# Patient Record
Sex: FEMALE | Race: WHITE | NOT HISPANIC OR LATINO | Employment: FULL TIME | URBAN - NONMETROPOLITAN AREA
[De-identification: names, ages, dates, MRNs, and addresses within clinical notes are randomized per-mention and may not be internally consistent; named-entity substitution may affect disease eponyms.]

---

## 2024-03-25 PROBLEM — E78.5 HYPERLIPIDEMIA: Status: ACTIVE | Noted: 2024-03-25

## 2024-03-25 PROBLEM — I10 ESSENTIAL HYPERTENSION: Status: ACTIVE | Noted: 2024-03-25

## 2024-03-25 PROBLEM — E78.1 ESSENTIAL HYPERTRIGLYCERIDEMIA: Status: ACTIVE | Noted: 2024-03-25

## 2024-03-25 PROBLEM — I25.10 CAD (CORONARY ARTERY DISEASE): Status: ACTIVE | Noted: 2024-03-25

## 2024-03-25 PROBLEM — E66.3 OVERWEIGHT: Status: ACTIVE | Noted: 2024-03-25

## 2024-03-25 PROBLEM — R07.89 CHEST DISCOMFORT: Status: ACTIVE | Noted: 2024-03-25

## 2024-03-25 PROBLEM — F17.200 CURRENT SMOKER: Status: ACTIVE | Noted: 2024-03-25

## 2024-03-25 PROBLEM — R94.30 ABNORMAL RESULTS OF CARDIOVASCULAR FUNCTION STUDIES: Status: ACTIVE | Noted: 2024-03-25

## 2024-03-25 PROBLEM — Z98.61 POST PTCA: Status: ACTIVE | Noted: 2024-03-25

## 2024-03-25 RX ORDER — ASPIRIN 81 MG/1
81 TABLET ORAL 3 TIMES WEEKLY
COMMUNITY

## 2024-03-25 RX ORDER — TIZANIDINE 4 MG/1
1 TABLET ORAL 3 TIMES DAILY PRN
COMMUNITY
End: 2024-04-09 | Stop reason: ALTCHOICE

## 2024-03-25 RX ORDER — FLUTICASONE PROPIONATE 50 MCG
2 SPRAY, SUSPENSION (ML) NASAL AS NEEDED
COMMUNITY

## 2024-03-25 RX ORDER — GABAPENTIN 100 MG/1
1 CAPSULE ORAL 3 TIMES DAILY
COMMUNITY
End: 2024-04-09 | Stop reason: ALTCHOICE

## 2024-03-25 RX ORDER — ATORVASTATIN CALCIUM 20 MG/1
20 TABLET, FILM COATED ORAL DAILY
COMMUNITY
End: 2024-04-09 | Stop reason: ALTCHOICE

## 2024-04-09 ENCOUNTER — OFFICE VISIT (OUTPATIENT)
Dept: CARDIOLOGY | Facility: CLINIC | Age: 59
End: 2024-04-09
Payer: COMMERCIAL

## 2024-04-09 ENCOUNTER — TELEPHONE (OUTPATIENT)
Dept: CARDIOLOGY | Facility: CLINIC | Age: 59
End: 2024-04-09

## 2024-04-09 VITALS
DIASTOLIC BLOOD PRESSURE: 70 MMHG | BODY MASS INDEX: 24.48 KG/M2 | SYSTOLIC BLOOD PRESSURE: 130 MMHG | HEART RATE: 74 BPM | HEIGHT: 67 IN | WEIGHT: 156 LBS

## 2024-04-09 DIAGNOSIS — E78.1 ESSENTIAL HYPERTRIGLYCERIDEMIA: ICD-10-CM

## 2024-04-09 DIAGNOSIS — E78.2 MIXED HYPERLIPIDEMIA: ICD-10-CM

## 2024-04-09 DIAGNOSIS — I25.10 CORONARY ARTERY DISEASE INVOLVING NATIVE CORONARY ARTERY OF NATIVE HEART WITHOUT ANGINA PECTORIS: Primary | ICD-10-CM

## 2024-04-09 DIAGNOSIS — Z98.61 POST PTCA: ICD-10-CM

## 2024-04-09 DIAGNOSIS — R07.9 CHEST PAIN, UNSPECIFIED TYPE: ICD-10-CM

## 2024-04-09 DIAGNOSIS — I10 ESSENTIAL HYPERTENSION: ICD-10-CM

## 2024-04-09 DIAGNOSIS — F17.200 CURRENT SMOKER: ICD-10-CM

## 2024-04-09 DIAGNOSIS — R07.89 CHEST DISCOMFORT: ICD-10-CM

## 2024-04-09 PROCEDURE — 99214 OFFICE O/P EST MOD 30 MIN: CPT | Performed by: INTERNAL MEDICINE

## 2024-04-09 PROCEDURE — 3078F DIAST BP <80 MM HG: CPT | Performed by: INTERNAL MEDICINE

## 2024-04-09 PROCEDURE — 3075F SYST BP GE 130 - 139MM HG: CPT | Performed by: INTERNAL MEDICINE

## 2024-04-09 ASSESSMENT — ENCOUNTER SYMPTOMS: PALPITATIONS: 1

## 2024-04-09 NOTE — LETTER
April 9, 2024     Stephanie Nick MD  [address] [place] [id number]    Patient: Carmina River   YOB: 1965   Date of Visit: 4/9/2024       Dear Dr. Stephanie Nick MD:    Thank you for referring Carmina River to me for evaluation. Below are my notes for this consultation.  If you have questions, please do not hesitate to call me. I look forward to following your patient along with you.       Sincerely,     Javed Monk MD      CC: No Recipients  ______________________________________________________________________________________    Subjective   Carmina River is a 58 y.o. female       Chief Complaint    Follow-up          HPI     Review of Systems   Cardiovascular:  Positive for chest pain and palpitations.   All other systems reviewed and are negative.     Patient returns in follow-up of problems as noted.  Recently she has been experiencing anginal type chest discomfort which she says is similar to symptoms she had about 20 years ago before her diagnosis of coronary disease and subsequent PTCA.  She is worried in this regard.  I recommended treadmill testing but she reminds me that she has severe degenerative joint disease of both knees and is unable to ambulate on a treadmill because of this a pharmacologic stress test with isotope imaging is recommended    Review of treatment strategy demonstrates that she, or someone, stopped her statin therapy.  Because of this we attempted to impress upon her the merits of reintroduction but she does not wish to do so and for the time being we will forego it but if in fact significant abnormalities are identified on stress test we will take the opportunity to impress upon her the paramount importance of statin therapy    She also continues to smoke.  She was counseled in this regard.  I pointed out to her that all the cardiology care in the world cannot overcome tobacco use and because of this she was encouraged to work on cessation.    For the  "moment it appears that her blood pressure is acceptable although mildly elevated and I believe could be improved with the addition of antihypertensive therapy.  Again she is resistant.  It appears her only concern is the presence or absence of coronary disease and she does not wish to modify her lifestyle or take medicine at the present time.      Vitals:    04/09/24 1115 04/09/24 1259   BP: 144/88 130/70   BP Location: Left arm Right arm   Patient Position: Sitting Sitting   Pulse: 74    Weight: 70.8 kg (156 lb)    Height: 1.702 m (5' 7\")         Objective   Physical Exam  Constitutional:       Appearance: Normal appearance.   HENT:      Nose: Nose normal.   Neck:      Vascular: No carotid bruit.   Cardiovascular:      Rate and Rhythm: Normal rate.      Pulses: Normal pulses.      Heart sounds: Normal heart sounds.   Pulmonary:      Effort: Pulmonary effort is normal.   Abdominal:      General: Bowel sounds are normal.      Palpations: Abdomen is soft.   Musculoskeletal:         General: Normal range of motion.      Cervical back: Normal range of motion.      Right lower leg: No edema.      Left lower leg: No edema.   Skin:     General: Skin is warm and dry.   Neurological:      General: No focal deficit present.      Mental Status: She is alert.   Psychiatric:         Mood and Affect: Mood normal.         Behavior: Behavior normal.         Thought Content: Thought content normal.         Judgment: Judgment normal.         Allergies  Ace inhibitors, Codeine, Penicillins, and Sulfa (sulfonamide antibiotics)     Current Medications    Current Outpatient Medications:   •  aspirin 81 mg EC tablet, Take 1 tablet (81 mg) by mouth 3 times a week., Disp: , Rfl:   •  fluticasone (Flonase) 50 mcg/actuation nasal spray, Administer 2 sprays into affected nostril(s) once daily., Disp: , Rfl:                      Assessment/Plan   1. Coronary artery disease involving native coronary artery of native heart without angina " pectoris  Patient has recurrent symptomatology and this may represent progression of coronary disease.  Hence, stress testing with isotope imaging is ordered    2. Essential hypertension  Blood pressure control could be improved but she does not wish to take medicine at this time    3. Essential hypertriglyceridemia  Could be improved but does not wish to take medicine    4. Mixed hyperlipidemia  Could be improved but does not wish to take medicine    5. Post PTCA  Previously a durable result of been achieved.  Now.  She may have had progression of disease.    6. Current smoker  The paramount importance of smoking cessation was emphasized    7. Chest pain, unspecified type  Symptoms are suspicious for angina    8. Chest discomfort  Symptoms are suspicious for angina      Scribe Attestation  By signing my name below, I, Zelda BERTRAND LPN  , Rosette   attest that this documentation has been prepared under the direction and in the presence of Javed Monk MD.     Provider Attestation - Scribe documentation    All medical record entries made by the Scribe were at my direction and personally dictated by me. I have reviewed the chart and agree that the record accurately reflects my personal performance of the history, physical exam, discussion and plan.

## 2024-04-09 NOTE — PROGRESS NOTES
"Subjective   Carmina River is a 58 y.o. female       Chief Complaint    Follow-up          HPI     Review of Systems   Cardiovascular:  Positive for chest pain and palpitations.   All other systems reviewed and are negative.     Patient returns in follow-up of problems as noted.  Recently she has been experiencing anginal type chest discomfort which she says is similar to symptoms she had about 20 years ago before her diagnosis of coronary disease and subsequent PTCA.  She is worried in this regard.  I recommended treadmill testing but she reminds me that she has severe degenerative joint disease of both knees and is unable to ambulate on a treadmill because of this a pharmacologic stress test with isotope imaging is recommended    Review of treatment strategy demonstrates that she, or someone, stopped her statin therapy.  Because of this we attempted to impress upon her the merits of reintroduction but she does not wish to do so and for the time being we will forego it but if in fact significant abnormalities are identified on stress test we will take the opportunity to impress upon her the paramount importance of statin therapy    She also continues to smoke.  She was counseled in this regard.  I pointed out to her that all the cardiology care in the world cannot overcome tobacco use and because of this she was encouraged to work on cessation.    For the moment it appears that her blood pressure is acceptable although mildly elevated and I believe could be improved with the addition of antihypertensive therapy.  Again she is resistant.  It appears her only concern is the presence or absence of coronary disease and she does not wish to modify her lifestyle or take medicine at the present time.      Vitals:    04/09/24 1115 04/09/24 1259   BP: 144/88 130/70   BP Location: Left arm Right arm   Patient Position: Sitting Sitting   Pulse: 74    Weight: 70.8 kg (156 lb)    Height: 1.702 m (5' 7\")         Objective "   Physical Exam  Constitutional:       Appearance: Normal appearance.   HENT:      Nose: Nose normal.   Neck:      Vascular: No carotid bruit.   Cardiovascular:      Rate and Rhythm: Normal rate.      Pulses: Normal pulses.      Heart sounds: Normal heart sounds.   Pulmonary:      Effort: Pulmonary effort is normal.   Abdominal:      General: Bowel sounds are normal.      Palpations: Abdomen is soft.   Musculoskeletal:         General: Normal range of motion.      Cervical back: Normal range of motion.      Right lower leg: No edema.      Left lower leg: No edema.   Skin:     General: Skin is warm and dry.   Neurological:      General: No focal deficit present.      Mental Status: She is alert.   Psychiatric:         Mood and Affect: Mood normal.         Behavior: Behavior normal.         Thought Content: Thought content normal.         Judgment: Judgment normal.         Allergies  Ace inhibitors, Codeine, Penicillins, and Sulfa (sulfonamide antibiotics)     Current Medications    Current Outpatient Medications:     aspirin 81 mg EC tablet, Take 1 tablet (81 mg) by mouth 3 times a week., Disp: , Rfl:     fluticasone (Flonase) 50 mcg/actuation nasal spray, Administer 2 sprays into affected nostril(s) once daily., Disp: , Rfl:                      Assessment/Plan   1. Coronary artery disease involving native coronary artery of native heart without angina pectoris  Patient has recurrent symptomatology and this may represent progression of coronary disease.  Hence, stress testing with isotope imaging is ordered    2. Essential hypertension  Blood pressure control could be improved but she does not wish to take medicine at this time    3. Essential hypertriglyceridemia  Could be improved but does not wish to take medicine    4. Mixed hyperlipidemia  Could be improved but does not wish to take medicine    5. Post PTCA  Previously a durable result of been achieved.  Now.  She may have had progression of disease.    6.  Current smoker  The paramount importance of smoking cessation was emphasized    7. Chest pain, unspecified type  Symptoms are suspicious for angina    8. Chest discomfort  Symptoms are suspicious for angina      Scribe Attestation  By signing my name below, I, Rosette Borjas LPN   attest that this documentation has been prepared under the direction and in the presence of Javed Monk MD.     Provider Attestation - Scribe documentation    All medical record entries made by the Scribe were at my direction and personally dictated by me. I have reviewed the chart and agree that the record accurately reflects my personal performance of the history, physical exam, discussion and plan.

## 2024-05-14 PROCEDURE — 93016 CV STRESS TEST SUPVJ ONLY: CPT | Performed by: INTERNAL MEDICINE

## 2024-05-14 PROCEDURE — 93018 CV STRESS TEST I&R ONLY: CPT | Performed by: INTERNAL MEDICINE

## 2024-05-14 PROCEDURE — 78452 HT MUSCLE IMAGE SPECT MULT: CPT | Performed by: INTERNAL MEDICINE

## 2024-06-04 ENCOUNTER — OFFICE VISIT (OUTPATIENT)
Dept: CARDIOLOGY | Facility: CLINIC | Age: 59
End: 2024-06-04
Payer: COMMERCIAL

## 2024-06-04 VITALS
HEART RATE: 76 BPM | BODY MASS INDEX: 24.17 KG/M2 | DIASTOLIC BLOOD PRESSURE: 68 MMHG | HEIGHT: 67 IN | WEIGHT: 154 LBS | SYSTOLIC BLOOD PRESSURE: 134 MMHG

## 2024-06-04 DIAGNOSIS — Z98.61 POST PTCA: ICD-10-CM

## 2024-06-04 DIAGNOSIS — I10 ESSENTIAL HYPERTENSION: ICD-10-CM

## 2024-06-04 DIAGNOSIS — F17.200 CURRENT SMOKER: ICD-10-CM

## 2024-06-04 DIAGNOSIS — I25.10 CORONARY ARTERY DISEASE INVOLVING NATIVE CORONARY ARTERY OF NATIVE HEART WITHOUT ANGINA PECTORIS: Primary | ICD-10-CM

## 2024-06-04 DIAGNOSIS — E78.2 MIXED HYPERLIPIDEMIA: ICD-10-CM

## 2024-06-04 PROCEDURE — 99214 OFFICE O/P EST MOD 30 MIN: CPT | Performed by: INTERNAL MEDICINE

## 2024-06-04 PROCEDURE — 3078F DIAST BP <80 MM HG: CPT | Performed by: INTERNAL MEDICINE

## 2024-06-04 PROCEDURE — 3075F SYST BP GE 130 - 139MM HG: CPT | Performed by: INTERNAL MEDICINE

## 2024-06-04 RX ORDER — VITAMIN E MIXED 400 UNIT
400 CAPSULE ORAL DAILY
COMMUNITY

## 2024-06-04 NOTE — PROGRESS NOTES
"Subjective   Carmina River is a 59 y.o. female       Chief Complaint    Follow-up          HPI     Patient returns in follow-up of recent testing.  She has done well.  Stress test we did to be negative.  No evidence of ischemia or scar or impairment of ejection fraction.  She is relieved in this regard.  As before we note she is not on statin therapy and this was discussed in great detail but she declines the implementation of lipid-lowering therapy.  Blood pressure is adequately controlled and she is on a antiplatelet agent.  She still smokes and the merits of smoking cessation were advocated in detail.    Vitals:    06/04/24 1004   BP: 134/68   BP Location: Left arm   Patient Position: Sitting   Pulse: 76   Weight: 69.9 kg (154 lb)   Height: 1.702 m (5' 7\")        Objective   Physical Exam  Constitutional:       Appearance: Normal appearance.   HENT:      Nose: Nose normal.   Neck:      Vascular: No carotid bruit.   Cardiovascular:      Rate and Rhythm: Normal rate.      Pulses: Normal pulses.      Heart sounds: Normal heart sounds.   Pulmonary:      Effort: Pulmonary effort is normal.   Abdominal:      General: Bowel sounds are normal.      Palpations: Abdomen is soft.   Musculoskeletal:         General: Normal range of motion.      Cervical back: Normal range of motion.      Right lower leg: No edema.      Left lower leg: No edema.   Skin:     General: Skin is warm and dry.   Neurological:      General: No focal deficit present.      Mental Status: She is alert.   Psychiatric:         Mood and Affect: Mood normal.         Behavior: Behavior normal.         Thought Content: Thought content normal.         Judgment: Judgment normal.         Allergies  Ace inhibitors, Codeine, Penicillins, and Sulfa (sulfonamide antibiotics)     Current Medications    Current Outpatient Medications:     aspirin 81 mg EC tablet, Take 1 tablet (81 mg) by mouth 3 times a week., Disp: , Rfl:     fluticasone (Flonase) 50 mcg/actuation " nasal spray, Administer 2 sprays into affected nostril(s) if needed., Disp: , Rfl:     vitamin E 180 mg (400 unit) capsule, Take 1 capsule (400 Units) by mouth once daily., Disp: , Rfl:                      Assessment/Plan   1. Coronary artery disease involving native coronary artery of native heart without angina pectoris  No recurrence of symptoms and recently negative stress test    2. Post PTCA  A durable result is been achieved with no recurrence of symptoms    3. Essential hypertension  Blood pressure adequate at the moment.    4. Mixed hyperlipidemia  Patient would benefit from treatment, but declines    5. Current smoker  The merits of cessation were advocated        Scribe Attestation  By signing my name below, I, Lauren OLIVAREZ LPN   , Scribe   attest that this documentation has been prepared under the direction and in the presence of Javed Monk MD.     Provider Attestation - Scribe documentation    All medical record entries made by the Scribe were at my direction and personally dictated by me. I have reviewed the chart and agree that the record accurately reflects my personal performance of the history, physical exam, discussion and plan.

## 2024-06-04 NOTE — LETTER
"June 4, 2024     Stephanie Nick MD  [address] [place] [id number]    Patient: Carmina River   YOB: 1965   Date of Visit: 6/4/2024       Dear Dr. Stephanie Nick MD:    Thank you for referring Carmina River to me for evaluation. Below are my notes for this consultation.  If you have questions, please do not hesitate to call me. I look forward to following your patient along with you.       Sincerely,     Javed Monk MD      CC: No Recipients  ______________________________________________________________________________________    Subjective   Carmina River is a 59 y.o. female       Chief Complaint    Follow-up          HPI     Patient returns in follow-up of recent testing.  She has done well.  Stress test we did to be negative.  No evidence of ischemia or scar or impairment of ejection fraction.  She is relieved in this regard.  As before we note she is not on statin therapy and this was discussed in great detail but she declines the implementation of lipid-lowering therapy.  Blood pressure is adequately controlled and she is on a antiplatelet agent.  She still smokes and the merits of smoking cessation were advocated in detail.    Vitals:    06/04/24 1004   BP: 134/68   BP Location: Left arm   Patient Position: Sitting   Pulse: 76   Weight: 69.9 kg (154 lb)   Height: 1.702 m (5' 7\")        Objective   Physical Exam  Constitutional:       Appearance: Normal appearance.   HENT:      Nose: Nose normal.   Neck:      Vascular: No carotid bruit.   Cardiovascular:      Rate and Rhythm: Normal rate.      Pulses: Normal pulses.      Heart sounds: Normal heart sounds.   Pulmonary:      Effort: Pulmonary effort is normal.   Abdominal:      General: Bowel sounds are normal.      Palpations: Abdomen is soft.   Musculoskeletal:         General: Normal range of motion.      Cervical back: Normal range of motion.      Right lower leg: No edema.      Left lower leg: No edema.   Skin:     General: Skin " is warm and dry.   Neurological:      General: No focal deficit present.      Mental Status: She is alert.   Psychiatric:         Mood and Affect: Mood normal.         Behavior: Behavior normal.         Thought Content: Thought content normal.         Judgment: Judgment normal.         Allergies  Ace inhibitors, Codeine, Penicillins, and Sulfa (sulfonamide antibiotics)     Current Medications    Current Outpatient Medications:   •  aspirin 81 mg EC tablet, Take 1 tablet (81 mg) by mouth 3 times a week., Disp: , Rfl:   •  fluticasone (Flonase) 50 mcg/actuation nasal spray, Administer 2 sprays into affected nostril(s) if needed., Disp: , Rfl:   •  vitamin E 180 mg (400 unit) capsule, Take 1 capsule (400 Units) by mouth once daily., Disp: , Rfl:                      Assessment/Plan   1. Coronary artery disease involving native coronary artery of native heart without angina pectoris  No recurrence of symptoms and recently negative stress test    2. Post PTCA  A durable result is been achieved with no recurrence of symptoms    3. Essential hypertension  Blood pressure adequate at the moment.    4. Mixed hyperlipidemia  Patient would benefit from treatment, but declines    5. Current smoker  The merits of cessation were advocated        Scribe Attestation  By signing my name below, I, Rosette Carreon LPN   attest that this documentation has been prepared under the direction and in the presence of Javed Monk MD.     Provider Attestation - Scribe documentation    All medical record entries made by the Scribe were at my direction and personally dictated by me. I have reviewed the chart and agree that the record accurately reflects my personal performance of the history, physical exam, discussion and plan.

## 2024-06-05 ENCOUNTER — APPOINTMENT (OUTPATIENT)
Dept: CARDIOLOGY | Facility: CLINIC | Age: 59
End: 2024-06-05
Payer: COMMERCIAL

## 2025-04-29 ENCOUNTER — APPOINTMENT (OUTPATIENT)
Dept: CARDIOLOGY | Facility: CLINIC | Age: 60
End: 2025-04-29
Payer: COMMERCIAL

## 2025-06-11 ENCOUNTER — APPOINTMENT (OUTPATIENT)
Dept: CARDIOLOGY | Facility: CLINIC | Age: 60
End: 2025-06-11
Payer: COMMERCIAL